# Patient Record
Sex: FEMALE | RURAL
[De-identification: names, ages, dates, MRNs, and addresses within clinical notes are randomized per-mention and may not be internally consistent; named-entity substitution may affect disease eponyms.]

---

## 2018-02-14 ENCOUNTER — HISTORICAL (OUTPATIENT)
Dept: ADMINISTRATIVE | Facility: HOSPITAL | Age: 38
End: 2018-02-14

## 2018-02-15 LAB
LAB AP CLINICAL INFORMATION: NORMAL
LAB AP DIAGNOSIS - HISTORICAL: NORMAL
LAB AP GENERAL CAT - HISTORICAL: NEGATIVE
LAB AP PREPARATIONS - HISTORICAL: NORMAL
LAB AP SPECIMEN SUBMITTED - HISTORICAL: NORMAL

## 2025-04-22 ENCOUNTER — OFFICE VISIT (OUTPATIENT)
Dept: OBSTETRICS AND GYNECOLOGY | Facility: CLINIC | Age: 45
End: 2025-04-22
Payer: MEDICARE

## 2025-04-22 VITALS
HEART RATE: 79 BPM | HEIGHT: 64 IN | BODY MASS INDEX: 35 KG/M2 | DIASTOLIC BLOOD PRESSURE: 82 MMHG | WEIGHT: 205 LBS | RESPIRATION RATE: 18 BRPM | SYSTOLIC BLOOD PRESSURE: 128 MMHG | OXYGEN SATURATION: 100 %

## 2025-04-22 DIAGNOSIS — Z12.72 SCREENING FOR VAGINAL CANCER: Primary | ICD-10-CM

## 2025-04-22 DIAGNOSIS — M31.31 WEGENER'S GRANULOMATOSIS WITH RENAL INVOLVEMENT: ICD-10-CM

## 2025-04-22 DIAGNOSIS — Z90.711 HISTORY OF PARTIAL HYSTERECTOMY: ICD-10-CM

## 2025-04-22 PROCEDURE — 3079F DIAST BP 80-89 MM HG: CPT | Mod: CPTII,,, | Performed by: ADVANCED PRACTICE MIDWIFE

## 2025-04-22 PROCEDURE — 99203 OFFICE O/P NEW LOW 30 MIN: CPT | Mod: PBBFAC | Performed by: ADVANCED PRACTICE MIDWIFE

## 2025-04-22 PROCEDURE — 1159F MED LIST DOCD IN RCRD: CPT | Mod: CPTII,,, | Performed by: ADVANCED PRACTICE MIDWIFE

## 2025-04-22 PROCEDURE — 99999 PR PBB SHADOW E&M-NEW PATIENT-LVL III: CPT | Mod: PBBFAC,,, | Performed by: ADVANCED PRACTICE MIDWIFE

## 2025-04-22 PROCEDURE — G0101 CA SCREEN;PELVIC/BREAST EXAM: HCPCS | Mod: S$PBB,,, | Performed by: ADVANCED PRACTICE MIDWIFE

## 2025-04-22 PROCEDURE — 87626 HPV SEP HI-RSK TYP&POOL RSLT: CPT | Mod: ,,, | Performed by: CLINICAL MEDICAL LABORATORY

## 2025-04-22 PROCEDURE — 3074F SYST BP LT 130 MM HG: CPT | Mod: CPTII,,, | Performed by: ADVANCED PRACTICE MIDWIFE

## 2025-04-22 PROCEDURE — 88142 CYTOPATH C/V THIN LAYER: CPT | Mod: TC,GCY | Performed by: ADVANCED PRACTICE MIDWIFE

## 2025-04-22 PROCEDURE — 3008F BODY MASS INDEX DOCD: CPT | Mod: CPTII,,, | Performed by: ADVANCED PRACTICE MIDWIFE

## 2025-04-22 RX ORDER — ASPIRIN 325 MG
50000 TABLET, DELAYED RELEASE (ENTERIC COATED) ORAL
COMMUNITY

## 2025-04-22 RX ORDER — OMADACYCLINE 150 MG/1
1 TABLET, FILM COATED ORAL 2 TIMES DAILY
COMMUNITY
Start: 2025-04-17

## 2025-04-22 RX ORDER — TOPIRAMATE 25 MG/1
75 TABLET ORAL DAILY
COMMUNITY
Start: 2025-03-24

## 2025-04-22 RX ORDER — ALBUTEROL SULFATE 90 UG/1
2 INHALANT RESPIRATORY (INHALATION) EVERY 6 HOURS PRN
COMMUNITY

## 2025-04-22 RX ORDER — TRIAMCINOLONE ACETONIDE 1 MG/G
1 CREAM TOPICAL 2 TIMES DAILY
COMMUNITY
Start: 2025-01-30

## 2025-04-22 RX ORDER — PREGABALIN 150 MG/1
150 CAPSULE ORAL 2 TIMES DAILY
COMMUNITY
Start: 2024-05-17

## 2025-04-22 RX ORDER — OMEPRAZOLE 20 MG/1
20 CAPSULE, DELAYED RELEASE ORAL DAILY
COMMUNITY
Start: 2024-05-17

## 2025-04-22 RX ORDER — HYDROCODONE BITARTRATE AND ACETAMINOPHEN 10; 325 MG/1; MG/1
1 TABLET ORAL EVERY 8 HOURS PRN
COMMUNITY
Start: 2025-04-07

## 2025-04-22 RX ORDER — MUPIROCIN 20 MG/G
1 OINTMENT TOPICAL 2 TIMES DAILY
COMMUNITY
Start: 2025-01-30

## 2025-04-22 RX ORDER — POTASSIUM CHLORIDE 1500 MG/1
20 TABLET, EXTENDED RELEASE ORAL DAILY
COMMUNITY
Start: 2024-05-17

## 2025-04-22 RX ORDER — FUROSEMIDE 20 MG/1
20 TABLET ORAL DAILY
COMMUNITY
Start: 2025-04-02

## 2025-04-22 NOTE — PROGRESS NOTES
"Subjective     Patient ID: Michael Johnson is a 44 y.o. female.    Chief Complaint: Gynecologic Exam (In for Annual Exam. Denies any problems at this time. Pts last pap was " years ago"/Pt stated that she had a MMG 2 months ago.)    History of partial hyst  MMG UTD   Patient reports history of 72 different surgical procedures.   Denies any spotting or bleeding.   Currently has a boot on her left foot/leg due to a wound.    Gynecologic Exam  The patient's pertinent negatives include no pelvic pain. Pertinent negatives include no dysuria, frequency or urgency.     Review of Systems   Constitutional: Negative.    HENT: Negative.     Eyes: Negative.    Respiratory: Negative.     Cardiovascular: Negative.    Gastrointestinal: Negative.    Endocrine: Negative.    Genitourinary: Negative.  Negative for dysuria, frequency, pelvic pain, urgency, vaginal bleeding and vaginal pain.   Musculoskeletal: Negative.    Integumentary:  Negative.   Allergic/Immunologic: Negative.    Neurological: Negative.    Psychiatric/Behavioral: Negative.       Past Medical History:   Diagnosis Date    Renal failure     Wegener's granulomatosis with renal involvement      Past Surgical History:   Procedure Laterality Date    BACK SURGERY      HYSTERECTOMY      partial    multiple surgeries      x 72 per pt report      OB History    Para Term  AB Living   2 2    2   SAB IAB Ectopic Multiple Live Births       2      # Outcome Date GA Lbr Junior/2nd Weight Sex Type Anes PTL Lv   2 Para      Vag-Spont      1 Para      Vag-Spont         Current Outpatient Medications   Medication Instructions    albuterol (PROVENTIL/VENTOLIN HFA) 90 mcg/actuation inhaler 2 puffs, Inhalation, Every 6 hours PRN    cholecalciferol (vitamin D3) 50,000 Units, Oral, Every 7 days    furosemide (LASIX) 20 mg, Daily    HYDROcodone-acetaminophen (NORCO)  mg per tablet 1 tablet, Every 8 hours PRN    linaCLOtide (LINZESS) 145 mcg, Before breakfast    mupirocin " (BACTROBAN) 1 g, 2 times daily    NUZYRA 150 mg Tab 1 tablet, 2 times daily    omeprazole (PRILOSEC) 20 mg, Daily    potassium chloride (K-TAB) 20 mEq 20 mEq, Daily    pregabalin (LYRICA) 150 mg, 2 times daily    topiramate (TOPAMAX) 75 mg, Daily    triamcinolone acetonide 0.1% (KENALOG) 1 g, 2 times daily         Objective   Vitals:    04/22/25 1302   BP: 128/82   Pulse: 79   Resp: 18     Wt Readings from Last 2 Encounters:   04/22/25 93 kg (205 lb)      Physical Exam  Vitals reviewed. Exam conducted with a chaperone present.   Constitutional:       Appearance: Normal appearance. She is obese.   HENT:      Head: Normocephalic.   Cardiovascular:      Rate and Rhythm: Normal rate and regular rhythm.   Pulmonary:      Effort: Pulmonary effort is normal.      Breath sounds: Normal breath sounds.   Chest:   Breasts:     Right: Normal. No mass.      Left: Normal. No mass.   Abdominal:      General: Abdomen is flat.      Palpations: Abdomen is soft.   Genitourinary:     General: Normal vulva.      Exam position: Lithotomy position.      Vagina: Normal.      Uterus: Absent.       Adnexa: Right adnexa normal and left adnexa normal.        Right: No mass.          Left: No mass.     Musculoskeletal:         General: Normal range of motion.      Cervical back: Normal range of motion.   Skin:     General: Skin is warm and dry.   Neurological:      Mental Status: She is alert and oriented to person, place, and time.   Psychiatric:         Mood and Affect: Mood normal.         Behavior: Behavior normal.        Assessment and Plan   1. Screening for vaginal cancer  -     ThinPrep Pap Test    2. Wegener's granulomatosis with renal involvement    3. History of partial hysterectomy    Patient was counseled today on Pap guidelines and recommendations for yearly pelvic exams, mammograms and monthly self breast awareness.  Counseled to see a PCP for other health maintenance.   Healthy diet and exercise encouraged.   Questions answered  to desired level of satisfaction  Verbalized understanding to all information and instructions       Follow up in about 1 year (around 4/22/2026), or if symptoms worsen or fail to improve, for Annual Exam.

## 2025-04-24 LAB
GH SERPL-MCNC: NORMAL NG/ML
INSULIN SERPL-ACNC: NORMAL U[IU]/ML
LAB AP CLINICAL INFORMATION: NORMAL
LAB AP GYN INTERPRETATION: NEGATIVE
LAB AP PAP DISCLAIMER COMMENTS: NORMAL
RENIN PLAS-CCNC: NORMAL NG/ML/H

## 2025-04-26 LAB
HPV 16: NEGATIVE
HPV 18: NEGATIVE
HPV OTHER: NEGATIVE

## 2025-04-28 ENCOUNTER — RESULTS FOLLOW-UP (OUTPATIENT)
Dept: OBSTETRICS AND GYNECOLOGY | Facility: CLINIC | Age: 45
End: 2025-04-28
Payer: MEDICARE

## 2025-04-28 NOTE — LETTER
"     May 1, 2025    Michael Johnson  [address] [place] [address] [place] [address]             [facility] - Obstetrics [facility] Gynecology  [address] [place] [address] [place] [address]  Phone: [phone]  Fax: [phone] Dear Ms. Michael Johnson:    Below are the results from your recent visit:    Resulted Orders   ThinPrep Pap Test   Result Value Ref Range    Case Report       Pap Cytology                                      Case: [id number]                                   Authorizing Provider:  Sigrid Gamez CNM        Collected:           04/22/2025 01:52 PM          Ordering Location:     [facility] Received:            04/23/2025 08:51 AM                                 - Obstetrics And                                                                                    Gynecology                                                                   First Screen:          Sigrid Goldberg CT(ASCP)                                                   Specimen:    Liquid-Based Pap Test, Screening, Vagina                                                   Interpretation Negative                General Categorization Negative for intraepithelial lesion or malignancy     Specimen Adequacy Satisfactory for evaluation     Clinical Information       pap      Disclaimer       Notice: An irreducible false negative rate exists with pap smears, therefore a negative result does not absolutely exclude malignancy.     HPV DNA probe, amplified   Result Value Ref Range    HPV 16 Negative Negative, Invalid    HPV 18 Negative Negative, Invalid    HPV Other Negative Negative, Invalid      Comment:      "HPV Other" analyte is the result for pooled HPV genotypes 31, 33, 35, 39 45, 51,52,56,58,59,66, and 68.     Your results are normal.        Sincerely,        JOSIANE Wang     "

## 2025-04-30 ENCOUNTER — TELEPHONE (OUTPATIENT)
Dept: OBSTETRICS AND GYNECOLOGY | Facility: CLINIC | Age: 45
End: 2025-04-30
Payer: MEDICARE

## 2025-04-30 NOTE — TELEPHONE ENCOUNTER
Left message for pt to return call to clinic       ----- Message from Sigrid Gamez sent at 4/28/2025  9:36 AM CDT -----  Call patient and inform her pap is negative.    ----- Message -----  From: Lab, Background User  Sent: 4/24/2025   5:46 PM CDT  To: Sigrid Gamez CNM